# Patient Record
Sex: MALE | Race: WHITE | Employment: FULL TIME | URBAN - METROPOLITAN AREA
[De-identification: names, ages, dates, MRNs, and addresses within clinical notes are randomized per-mention and may not be internally consistent; named-entity substitution may affect disease eponyms.]

---

## 2021-06-24 RX ORDER — LISINOPRIL 5 MG/1
5 TABLET ORAL DAILY
Qty: 30 TABLET | Refills: 2 | Status: SHIPPED | OUTPATIENT
Start: 2021-06-24 | End: 2021-06-28 | Stop reason: SDUPTHER

## 2021-06-24 RX ORDER — LISINOPRIL 5 MG/1
5 TABLET ORAL DAILY
COMMUNITY
End: 2021-06-24 | Stop reason: SDUPTHER

## 2021-06-28 ENCOUNTER — OFFICE VISIT (OUTPATIENT)
Dept: PRIMARY CARE CLINIC | Age: 51
End: 2021-06-28
Payer: COMMERCIAL

## 2021-06-28 ENCOUNTER — TELEPHONE (OUTPATIENT)
Dept: PRIMARY CARE CLINIC | Age: 51
End: 2021-06-28

## 2021-06-28 VITALS
SYSTOLIC BLOOD PRESSURE: 136 MMHG | BODY MASS INDEX: 37.92 KG/M2 | OXYGEN SATURATION: 98 % | HEART RATE: 61 BPM | HEIGHT: 69 IN | WEIGHT: 256 LBS | DIASTOLIC BLOOD PRESSURE: 94 MMHG

## 2021-06-28 DIAGNOSIS — I10 ESSENTIAL HYPERTENSION: Primary | ICD-10-CM

## 2021-06-28 PROCEDURE — 99203 OFFICE O/P NEW LOW 30 MIN: CPT | Performed by: FAMILY MEDICINE

## 2021-06-28 RX ORDER — LISINOPRIL 5 MG/1
5 TABLET ORAL DAILY
Qty: 90 TABLET | Refills: 2 | Status: SHIPPED | OUTPATIENT
Start: 2021-06-28 | End: 2021-07-27 | Stop reason: SDUPTHER

## 2021-06-28 SDOH — ECONOMIC STABILITY: FOOD INSECURITY: WITHIN THE PAST 12 MONTHS, YOU WORRIED THAT YOUR FOOD WOULD RUN OUT BEFORE YOU GOT MONEY TO BUY MORE.: NEVER TRUE

## 2021-06-28 SDOH — ECONOMIC STABILITY: FOOD INSECURITY: WITHIN THE PAST 12 MONTHS, THE FOOD YOU BOUGHT JUST DIDN'T LAST AND YOU DIDN'T HAVE MONEY TO GET MORE.: NEVER TRUE

## 2021-06-28 ASSESSMENT — PATIENT HEALTH QUESTIONNAIRE - PHQ9
1. LITTLE INTEREST OR PLEASURE IN DOING THINGS: 0
SUM OF ALL RESPONSES TO PHQ QUESTIONS 1-9: 0
2. FEELING DOWN, DEPRESSED OR HOPELESS: 0
SUM OF ALL RESPONSES TO PHQ QUESTIONS 1-9: 0
SUM OF ALL RESPONSES TO PHQ9 QUESTIONS 1 & 2: 0
SUM OF ALL RESPONSES TO PHQ QUESTIONS 1-9: 0

## 2021-06-28 ASSESSMENT — ENCOUNTER SYMPTOMS
WHEEZING: 0
ABDOMINAL PAIN: 0
SORE THROAT: 0
SHORTNESS OF BREATH: 0

## 2021-06-28 ASSESSMENT — SOCIAL DETERMINANTS OF HEALTH (SDOH): HOW HARD IS IT FOR YOU TO PAY FOR THE VERY BASICS LIKE FOOD, HOUSING, MEDICAL CARE, AND HEATING?: NOT HARD AT ALL

## 2021-06-28 NOTE — PROGRESS NOTES
Subjective:     Patient ID: Mireille Baig is a 45 y.o. male    HPI  Type presents today to get it reestablished in the practice. He feels that he has been doing pretty well. He remarks that he is get a great job and really likes working to have good benefits. One of the benefits cysts that there is a health clinic where they check on him and do blood work and check his blood pressure and he feels good about that. His review of systems essentially unremarkable today. caffiene  optivia diet  Weekend drinker  Beer, rum and coke  FHx lung, breast ca  Neg heart, DM    Review of Systems   Constitutional: Negative for fever. HENT: Negative for congestion, ear pain and sore throat. Respiratory: Negative for shortness of breath and wheezing. Cardiovascular: Negative for chest pain and leg swelling. Gastrointestinal: Negative for abdominal pain. Genitourinary: Negative for dysuria. Skin: Negative for rash. Neurological: Negative for syncope. Hematological: Negative for adenopathy. Objective:     Physical Exam  Vitals and nursing note reviewed. Constitutional:       General: He is not in acute distress. Appearance: Normal appearance. HENT:      Head: Normocephalic. Right Ear: External ear normal.      Left Ear: External ear normal.      Nose: Nose normal.      Mouth/Throat:      Mouth: Mucous membranes are moist.   Eyes:      Conjunctiva/sclera: Conjunctivae normal.   Cardiovascular:      Rate and Rhythm: Normal rate and regular rhythm. Heart sounds: Normal heart sounds. Pulmonary:      Effort: Pulmonary effort is normal.      Breath sounds: Normal breath sounds. Musculoskeletal:      Cervical back: Normal range of motion. Right lower leg: No edema. Left lower leg: No edema. Skin:     General: Skin is warm and dry. Neurological:      General: No focal deficit present. Mental Status: He is alert and oriented to person, place, and time.    Psychiatric:

## 2021-06-28 NOTE — TELEPHONE ENCOUNTER
PT NEEDING REFILL ON LISINOPRIL 90 DAYS. MARIAELENA FAXED OVER REQUEST.  PENDED THE MED IN TODAY'S PRE-CHARTING FOR OFFICE VISIT LATER TODAY

## 2021-06-28 NOTE — PATIENT INSTRUCTIONS
WEIGHT LOSS    Recommend 10 % weight loss over 3 months  Low glycemic index diet  Avoid naked carbohydrates, e.g. peanut butter added to small banana  Avoid carbohydrate to fiber ratio more than 10:1  Whole oatmeal at least every other day at breakfast  May have 6 eggs per week  Focus on 25 gms of protein at breakfast  Avoid carbos first thing in morning to limit glucose surge  Limit animal protein  Limit dairy  Spinach salad daily  Try for 25-50 grams of fiber per day

## 2021-07-27 DIAGNOSIS — I10 ESSENTIAL HYPERTENSION: Primary | ICD-10-CM

## 2021-07-27 RX ORDER — LISINOPRIL 5 MG/1
5 TABLET ORAL DAILY
Qty: 90 TABLET | Refills: 2 | Status: SHIPPED | OUTPATIENT
Start: 2021-07-27 | End: 2021-09-01 | Stop reason: SDUPTHER

## 2021-09-01 DIAGNOSIS — I10 ESSENTIAL HYPERTENSION: ICD-10-CM

## 2021-09-01 RX ORDER — LISINOPRIL 5 MG/1
5 TABLET ORAL DAILY
Qty: 90 TABLET | Refills: 2 | Status: SHIPPED | OUTPATIENT
Start: 2021-09-01 | End: 2022-09-07 | Stop reason: SDUPTHER

## 2021-09-28 ENCOUNTER — OFFICE VISIT (OUTPATIENT)
Dept: PRIMARY CARE CLINIC | Age: 51
End: 2021-09-28
Payer: COMMERCIAL

## 2021-09-28 VITALS
HEIGHT: 69 IN | HEART RATE: 80 BPM | OXYGEN SATURATION: 98 % | SYSTOLIC BLOOD PRESSURE: 130 MMHG | DIASTOLIC BLOOD PRESSURE: 86 MMHG | WEIGHT: 258 LBS | BODY MASS INDEX: 38.21 KG/M2

## 2021-09-28 DIAGNOSIS — I10 ESSENTIAL HYPERTENSION: Primary | ICD-10-CM

## 2021-09-28 PROCEDURE — 99213 OFFICE O/P EST LOW 20 MIN: CPT | Performed by: FAMILY MEDICINE

## 2021-09-28 ASSESSMENT — PATIENT HEALTH QUESTIONNAIRE - PHQ9
SUM OF ALL RESPONSES TO PHQ QUESTIONS 1-9: 0
SUM OF ALL RESPONSES TO PHQ QUESTIONS 1-9: 0
SUM OF ALL RESPONSES TO PHQ9 QUESTIONS 1 & 2: 0
2. FEELING DOWN, DEPRESSED OR HOPELESS: 0
1. LITTLE INTEREST OR PLEASURE IN DOING THINGS: 0
SUM OF ALL RESPONSES TO PHQ QUESTIONS 1-9: 0

## 2021-09-28 NOTE — PROGRESS NOTES
Subjective:     Patient ID: Cally Bueno is a 45 y.o. male    HPI  Patient is here today for follow-up of his hypertension as well as his obesity. His daughter got  here recently and he admits that he has not watch his nutrition as closely as he should. His weight is up from previous visits. He does exercise on a regular basis with his work and thinks he walks 9-12,000 steps per day. Overall he feels well and does not complain of fatigue chest pain shortness of breath. He knows he needs to lose weight to decrease his risk. Had cscope earlier this year by Dr. Cj Snider at [facility].  We will ask for copy of report for our records. We also discussed PSA testing       Review of Systems   Constitutional: Negative for activity change, appetite change, fatigue and fever. HENT: Negative for sore throat. Eyes: Negative for visual disturbance. Respiratory: Negative for cough, chest tightness and shortness of breath. Cardiovascular: Negative for chest pain, palpitations and leg swelling. Gastrointestinal: Negative for constipation and diarrhea. Genitourinary: Negative for dysuria and urgency. Musculoskeletal: Negative for back pain. Neurological: Negative for dizziness, syncope and headaches. Hematological: Does not bruise/bleed easily. Psychiatric/Behavioral: Negative for confusion and sleep disturbance. The patient is not nervous/anxious. Objective:     Physical Exam  Constitutional:       Appearance: Normal appearance. He is obese. HENT:      Head: Normocephalic. Right Ear: External ear normal.      Left Ear: External ear normal.      Nose: Nose normal.      Mouth/Throat:      Mouth: Mucous membranes are moist.      Pharynx: Oropharynx is clear. Eyes:      Conjunctiva/sclera: Conjunctivae normal.   Cardiovascular:      Rate and Rhythm: Normal rate and regular rhythm. Pulses: Normal pulses. Heart sounds: Normal heart sounds. No murmur heard. Pulmonary:      Effort: Pulmonary effort is normal.      Breath sounds: Normal breath sounds. Musculoskeletal:         General: Normal range of motion. Cervical back: Neck supple. Right lower leg: No edema. Left lower leg: No edema. Lymphadenopathy:      Cervical: No cervical adenopathy. Skin:     General: Skin is warm and dry. Capillary Refill: Capillary refill takes less than 2 seconds. Neurological:      General: No focal deficit present. Mental Status: He is alert and oriented to person, place, and time. Psychiatric:         Mood and Affect: Mood normal.         Behavior: Behavior normal.           Assessment/Plan:     1. Essential hypertension    2. BMI 38.0-38.9,adult            Sharon Howard was seen today for hypertension. Diagnoses and all orders for this visit:    Essential hypertension    BMI 38.0-38.9,adult    His blood pressure is fairly well controlled with his dose of Prinivil. Reinforced the need for weight loss. If he got a 10% weight loss he would feel better with more energy and potentially be off blood pressure medicines. Patient verbalized understanding. Patient instructions given with specifics in regard to weight loss          Cristela Miller MD    Please note that this chart was generated using voice recognition Dragon dictation software. Although every effort was made to ensure the accuracy of this automated transcription, some errors in transcription may have occurred.

## 2021-09-29 ASSESSMENT — ENCOUNTER SYMPTOMS
SORE THROAT: 0
CHEST TIGHTNESS: 0
CONSTIPATION: 0
SHORTNESS OF BREATH: 0
COUGH: 0
BACK PAIN: 0
DIARRHEA: 0

## 2021-10-04 ENCOUNTER — TELEPHONE (OUTPATIENT)
Dept: PRIMARY CARE CLINIC | Age: 51
End: 2021-10-04

## 2021-10-04 NOTE — TELEPHONE ENCOUNTER
----- Message from Sirena Lara RN sent at 9/30/2021  4:26 PM EDT -----  Regarding: colonoscopy  No rush, can you check on the colonoscopy report from the spring at [facility]

## 2022-09-06 ENCOUNTER — TELEPHONE (OUTPATIENT)
Dept: PRIMARY CARE CLINIC | Age: 52
End: 2022-09-06

## 2022-09-06 NOTE — TELEPHONE ENCOUNTER
----- Message from Kati Cardenas sent at 9/6/2022  9:10 AM EDT -----  Subject: Appointment Request    Reason for Call: Established Patient Appointment needed: Routine Existing   Condition Follow Up    QUESTIONS    Reason for appointment request? Available appointments did not meet   patient need     Additional Information for Provider? patient will be out of bp medicine in   2 wks and would like an appt.  before November around 3:30  ---------------------------------------------------------------------------  --------------  [id number]  [phone]; OK to leave message on voicemail  ---------------------------------------------------------------------------  --------------  SCRIPT ANSWERS  COVID Screen: Latricia Medina

## 2022-09-07 DIAGNOSIS — I10 ESSENTIAL HYPERTENSION: ICD-10-CM

## 2022-09-07 RX ORDER — LISINOPRIL 5 MG/1
5 TABLET ORAL DAILY
Qty: 90 TABLET | Refills: 0 | Status: SHIPPED | OUTPATIENT
Start: 2022-09-07 | End: 2022-10-13 | Stop reason: SDUPTHER

## 2022-09-07 NOTE — TELEPHONE ENCOUNTER
----- Message from [facility] BENI Mosher sent at 9/7/2022 12:43 PM EDT -----  Subject: Appointment Request    Reason for Call: Established Patient Appointment needed: Routine Existing   Condition Follow Up    QUESTIONS    Reason for appointment request? Available appointments did not meet   patient need     Additional Information for Provider? The pt. called and stated that he   needs a refill on his blood pressure medications and there are currently   no available appt times for the pt until November. The pt stated he has   only ten more days of his medication left at this time. The pt would like   to know if there are any other options at this time.   ---------------------------------------------------------------------------  --------------  Omelia Counter [facility]  [phone]; OK to leave message on voicemail  ---------------------------------------------------------------------------  --------------  SCRIPT ANSWERS  COVID Screen: Earnestine Dwyer

## 2022-09-07 NOTE — TELEPHONE ENCOUNTER
Patient scheduled for 10/13/22. Patient will be out of medication before time of appointment. Pended one month supply. Let patient know I cannot guarantee refill since he has not been seen since last year.

## 2022-10-13 ENCOUNTER — OFFICE VISIT (OUTPATIENT)
Dept: PRIMARY CARE CLINIC | Age: 52
End: 2022-10-13
Payer: COMMERCIAL

## 2022-10-13 VITALS
SYSTOLIC BLOOD PRESSURE: 120 MMHG | BODY MASS INDEX: 39.69 KG/M2 | OXYGEN SATURATION: 98 % | DIASTOLIC BLOOD PRESSURE: 78 MMHG | HEIGHT: 69 IN | HEART RATE: 65 BPM | WEIGHT: 268 LBS

## 2022-10-13 DIAGNOSIS — Z12.11 SCREENING FOR COLON CANCER: ICD-10-CM

## 2022-10-13 DIAGNOSIS — I10 ESSENTIAL HYPERTENSION: Primary | ICD-10-CM

## 2022-10-13 PROCEDURE — 99214 OFFICE O/P EST MOD 30 MIN: CPT | Performed by: FAMILY MEDICINE

## 2022-10-13 RX ORDER — LISINOPRIL 5 MG/1
5 TABLET ORAL DAILY
Qty: 30 TABLET | Refills: 0 | Status: SHIPPED | OUTPATIENT
Start: 2022-10-13 | End: 2022-10-30 | Stop reason: SDUPTHER

## 2022-10-13 SDOH — ECONOMIC STABILITY: FOOD INSECURITY: WITHIN THE PAST 12 MONTHS, YOU WORRIED THAT YOUR FOOD WOULD RUN OUT BEFORE YOU GOT MONEY TO BUY MORE.: NEVER TRUE

## 2022-10-13 SDOH — ECONOMIC STABILITY: FOOD INSECURITY: WITHIN THE PAST 12 MONTHS, THE FOOD YOU BOUGHT JUST DIDN'T LAST AND YOU DIDN'T HAVE MONEY TO GET MORE.: NEVER TRUE

## 2022-10-13 ASSESSMENT — PATIENT HEALTH QUESTIONNAIRE - PHQ9
SUM OF ALL RESPONSES TO PHQ QUESTIONS 1-9: 0
2. FEELING DOWN, DEPRESSED OR HOPELESS: 0
SUM OF ALL RESPONSES TO PHQ QUESTIONS 1-9: 0
SUM OF ALL RESPONSES TO PHQ QUESTIONS 1-9: 0
1. LITTLE INTEREST OR PLEASURE IN DOING THINGS: 0
SUM OF ALL RESPONSES TO PHQ QUESTIONS 1-9: 0
SUM OF ALL RESPONSES TO PHQ9 QUESTIONS 1 & 2: 0

## 2022-10-13 ASSESSMENT — ENCOUNTER SYMPTOMS
COUGH: 0
SHORTNESS OF BREATH: 0
SORE THROAT: 0
DIARRHEA: 0
CONSTIPATION: 0
BACK PAIN: 0
CHEST TIGHTNESS: 0

## 2022-10-13 ASSESSMENT — SOCIAL DETERMINANTS OF HEALTH (SDOH): HOW HARD IS IT FOR YOU TO PAY FOR THE VERY BASICS LIKE FOOD, HOUSING, MEDICAL CARE, AND HEATING?: NOT HARD AT ALL

## 2022-10-13 NOTE — PROGRESS NOTES
Subjective:     Patient ID: Izell Saint is a 45 y.o. male    HPI  Mile Azar returns today for recheck on HT and obesity. He had labs done at job which I reviewed for him. His FBS was elevated, waiting on A1c. Overall he feels well but has had no luck losing weight. He would like to try something to help. We discussed Mounjaro at length. He understands that it would be off label use but is agreeable  Has been golfing all summer. Reinforced the LGI     Review of Systems   Constitutional:  Negative for activity change, appetite change, fatigue and fever. HENT:  Negative for sore throat. Eyes:  Negative for visual disturbance. Respiratory:  Negative for cough, chest tightness and shortness of breath. Cardiovascular:  Negative for chest pain, palpitations and leg swelling. Gastrointestinal:  Negative for constipation and diarrhea. Genitourinary:  Negative for dysuria and urgency. Musculoskeletal:  Negative for back pain. Neurological:  Negative for dizziness, syncope and headaches. Hematological:  Does not bruise/bleed easily. Psychiatric/Behavioral:  Negative for confusion and sleep disturbance. The patient is not nervous/anxious. Objective:     Physical Exam  Vitals and nursing note reviewed. Constitutional:       Appearance: Normal appearance. He is obese. HENT:      Head: Normocephalic. Right Ear: External ear normal.      Left Ear: External ear normal.      Nose: Nose normal.      Mouth/Throat:      Mouth: Mucous membranes are moist.      Pharynx: Oropharynx is clear. Eyes:      Conjunctiva/sclera: Conjunctivae normal.      Pupils: Pupils are equal, round, and reactive to light. Cardiovascular:      Rate and Rhythm: Normal rate and regular rhythm. Pulses: Normal pulses. Heart sounds: Normal heart sounds. No murmur heard. Pulmonary:      Effort: Pulmonary effort is normal.      Breath sounds: Normal breath sounds. No wheezing.    Musculoskeletal:         General: Normal range of motion. Cervical back: Neck supple. Right lower leg: No edema. Left lower leg: No edema. Lymphadenopathy:      Cervical: No cervical adenopathy. Skin:     General: Skin is warm and dry. Capillary Refill: Capillary refill takes less than 2 seconds. Neurological:      General: No focal deficit present. Mental Status: He is alert and oriented to person, place, and time. Psychiatric:         Mood and Affect: Mood normal.         Behavior: Behavior normal.       Assessment/Plan:     1. Essential hypertension    2. BMI 39.0-39.9,adult    3. Screening for colon cancer          Dawson Rahul was seen today for hypertension. Diagnoses and all orders for this visit:    Essential hypertension  -     lisinopril (PRINIVIL;ZESTRIL) 5 MG tablet; Take 1 tablet by mouth daily  Good control   avoid added salt and caffeine  BMI 39.0-39.9,adult  Reinforced weight loss regimen  Wants to try [facility] at 2.5 mg weekly  Rto 1 month, if tolerates increase dose to 5 mg. Screening for colon cancer  -     Cologuard (Fecal DNA Colorectal Cancer Screening)    Other orders  -     Tirzepatide (MOUNJARO) 2.5 MG/0.5ML SOPN SC injection; Inject 0.5 mLs into the skin once a week        Fiorella Duran MD    Please note that this chart was generated using voice recognition Dragon dictation software. Although every effort was made to ensure the accuracy of this automated transcription, some errors in transcription may have occurred.

## 2022-10-14 DIAGNOSIS — I10 ESSENTIAL HYPERTENSION: ICD-10-CM

## 2022-10-14 NOTE — TELEPHONE ENCOUNTER
[facility] asking for 90 days to be call in . Confirm with [facility] ok 90 days supply with no refills .

## 2022-10-14 NOTE — TELEPHONE ENCOUNTER
Last visit: 10/13/22  Last Med refill: 10/13/22  Does patient have enough medication for 72 hours: Next Visit Date:  Future Appointments   Date Time Provider Donya Sandoval   11/28/2022  4:30 PM Brad Clemons MD [place] [facility]   Topic Date Due    DTaP/Tdap/Td vaccine (1 - Tdap) Never done    Diabetes screen  Never done    Lipids  Never done    Shingles vaccine (1 of 2) Never done    COVID-19 Vaccine (4 - Booster for Pfizer series) 05/14/2022    Flu vaccine (1) Never done    Depression Screen  10/13/2023    Colorectal Cancer Screen  09/18/2030    Hepatitis A vaccine  Aged Out    Hib vaccine  Aged Out    Meningococcal (ACWY) vaccine  Aged Out    Pneumococcal 0-64 years Vaccine  Aged Out    Hepatitis C screen  Discontinued    HIV screen  Discontinued       No results found for: LABA1C          ( goal A1C is < 7)   No results found for: LABMICR  No results found for: LDLCHOLESTEROL, [address]    (goal LDL is <100)   No results found for: AST, ALT, BUN, CR  BP Readings from Last 3 Encounters:   10/13/22 120/78   09/28/21 130/86   06/28/21 (!) 136/94          (goal 120/80)    All Future Testing planned in [facility]  Lab Frequency Next Occurrence               There is no problem list on file for this patient.

## 2022-10-18 RX ORDER — LISINOPRIL 5 MG/1
5 TABLET ORAL DAILY
Qty: 90 TABLET | OUTPATIENT
Start: 2022-10-18 | End: 2022-11-17

## 2022-10-20 ENCOUNTER — TELEPHONE (OUTPATIENT)
Dept: PRIMARY CARE CLINIC | Age: 52
End: 2022-10-20

## 2022-10-20 NOTE — TELEPHONE ENCOUNTER
Olga called stating her  is having a hard time getting his Mounjaro medication from [email or web address] in . She states that the pharmacy kept trying to run it through insurance and she told them that they arent using the insurance with it, they are using the [address] coupon. The pharmacy then proceeded to tell them that they can use the form from [facility] due to the patient not having a dx of Type II DM. Jennie Singh has not been able to start [facility] yet because of this. They are asking for help from the office. I told [address] we would reach out to [place] and call her back.

## 2022-10-20 NOTE — TELEPHONE ENCOUNTER
Mi just called back to let us know that they are going to process the script for the patient now. The pharmacist also stated that she will call  the patient now to let them know.

## 2022-10-20 NOTE — TELEPHONE ENCOUNTER
I did a pre auth for the medication just now, it was sent to insurance plan. I called sebastian to let her know that I spoke with the pharmacy and that we are going to contact our drug rep for answers on what diagnosis are approved and get back with her tomorrow.

## 2022-10-28 ENCOUNTER — PATIENT MESSAGE (OUTPATIENT)
Dept: PRIMARY CARE CLINIC | Age: 52
End: 2022-10-28

## 2022-10-28 DIAGNOSIS — I10 ESSENTIAL HYPERTENSION: ICD-10-CM

## 2022-10-28 NOTE — TELEPHONE ENCOUNTER
From: Hui Salgado  To: Dr. Dhruv Parada: 10/28/2022 8:48 AM EDT  Subject: Next appt & prescriptions    Hello,  I have a couple issues. 1. I started Mounjaro on 10/22/22. I am doing very well on the 2.5 mg dose and would like to increase to the 5 mg after I do my fourth injection on 11/12/22. However, my next appt with you is not until 11/28/22. I am out of town from 11/12-11/19. I don't want to do the refill for 2.5 mg. I have to have appts after 3:45 pm on weekdays otherwise I lose incentive pay at work. What do I do? 2. I do not have enough blood pressure pills to last me until 11/28/22. If I can get a written prescription, I can get them filled free at my work's medical clinic. Thank you!

## 2022-10-30 RX ORDER — TIRZEPATIDE 5 MG/.5ML
5 INJECTION, SOLUTION SUBCUTANEOUS WEEKLY
Qty: 4 ADJUSTABLE DOSE PRE-FILLED PEN SYRINGE | Refills: 5 | Status: SHIPPED | OUTPATIENT
Start: 2022-10-30

## 2022-10-30 RX ORDER — LISINOPRIL 5 MG/1
5 TABLET ORAL DAILY
Qty: 30 TABLET | Refills: 5 | Status: SHIPPED | OUTPATIENT
Start: 2022-10-30 | End: 2022-11-01 | Stop reason: SDUPTHER

## 2022-11-01 DIAGNOSIS — I10 ESSENTIAL HYPERTENSION: ICD-10-CM

## 2022-11-01 RX ORDER — LISINOPRIL 5 MG/1
5 TABLET ORAL DAILY
Qty: 30 TABLET | Refills: 5 | Status: SHIPPED | OUTPATIENT
Start: 2022-11-01 | End: 2022-12-01

## 2022-11-28 ENCOUNTER — OFFICE VISIT (OUTPATIENT)
Dept: PRIMARY CARE CLINIC | Age: 52
End: 2022-11-28
Payer: COMMERCIAL

## 2022-11-28 VITALS
HEIGHT: 69 IN | DIASTOLIC BLOOD PRESSURE: 84 MMHG | BODY MASS INDEX: 38.09 KG/M2 | WEIGHT: 257.2 LBS | HEART RATE: 76 BPM | OXYGEN SATURATION: 97 % | SYSTOLIC BLOOD PRESSURE: 122 MMHG

## 2022-11-28 DIAGNOSIS — I10 ESSENTIAL HYPERTENSION: ICD-10-CM

## 2022-11-28 DIAGNOSIS — E66.01 MORBID OBESITY (HCC): ICD-10-CM

## 2022-11-28 DIAGNOSIS — R10.9 LEFT FLANK PAIN: Primary | ICD-10-CM

## 2022-11-28 PROCEDURE — 3074F SYST BP LT 130 MM HG: CPT | Performed by: FAMILY MEDICINE

## 2022-11-28 PROCEDURE — 99214 OFFICE O/P EST MOD 30 MIN: CPT | Performed by: FAMILY MEDICINE

## 2022-11-28 PROCEDURE — 3078F DIAST BP <80 MM HG: CPT | Performed by: FAMILY MEDICINE

## 2022-11-28 ASSESSMENT — PATIENT HEALTH QUESTIONNAIRE - PHQ9
1. LITTLE INTEREST OR PLEASURE IN DOING THINGS: 0
SUM OF ALL RESPONSES TO PHQ QUESTIONS 1-9: 0
SUM OF ALL RESPONSES TO PHQ9 QUESTIONS 1 & 2: 0
2. FEELING DOWN, DEPRESSED OR HOPELESS: 0
SUM OF ALL RESPONSES TO PHQ QUESTIONS 1-9: 0

## 2022-11-28 ASSESSMENT — ENCOUNTER SYMPTOMS
DIARRHEA: 0
SORE THROAT: 0
CONSTIPATION: 0
BACK PAIN: 1
WHEEZING: 0
NAUSEA: 0
ABDOMINAL PAIN: 0
SHORTNESS OF BREATH: 0

## 2022-11-28 NOTE — PROGRESS NOTES
Subjective:     Patient ID: Jaclyn Damico is a 45 y.o. male    HPI  Yamila Shields returns today for a follow-up on his morbid obesity after starting  Alec Heater and is doing quite well without side effects. He feels he could be doing a lot better with his diet but was on a cruise 2 weeks ago. He feels more full and satisfied earlier. Has lost 11 pounds and is really excited about these changes  Sometime after he started the medicine he developed some left low back pain. It apparently became much worse when he was on a cruise ship. He had blood work done which was all within normal limits and his white blood count was normal.  He says since he has been home he has actually felt much better but does have some pain when he goes to tie his. Denies any change in his bowel movements in regard to the flank pain. Denies any dysuria or blood in urine to suggest kidney stone. Review of Systems   Constitutional:  Negative for fever. HENT:  Negative for congestion, ear pain and sore throat. Respiratory:  Negative for shortness of breath and wheezing. Cardiovascular:  Negative for chest pain and leg swelling. Gastrointestinal:  Negative for abdominal pain, constipation, diarrhea and nausea. Genitourinary:  Positive for flank pain. Negative for difficulty urinating, dysuria, frequency, hematuria and urgency. Musculoskeletal:  Positive for back pain. Skin:  Negative for rash. Neurological:  Negative for syncope. Hematological:  Negative for adenopathy. Psychiatric/Behavioral:  Negative for sleep disturbance. The patient is not nervous/anxious. Objective:     Physical Exam  Constitutional:       Appearance: He is obese. Abdominal:      General: There is no distension. Palpations: There is no mass. Tenderness: There is no abdominal tenderness. There is left CVA tenderness. There is no guarding or rebound.    Musculoskeletal:      Comments: Tenderness he has in his left flank really appears to be muscular in origin on the basis of my exam       Assessment/Plan:     1. Left flank pain    2. BMI 37.0-37.9, adult    3. Essential hypertension          Adalgisa Escobar was seen today for hypertension, other and back pain. Diagnoses and all orders for this visit:    Left flank pain  Most probably musculoskeletal.  Doubt kidney stone or diverticular disease. If gets worse will do imaging. Patient will call  Morbid obesity  BMI 37.0-37.9, adult  Continue Mounjaro   Essential hypertension  Good control with current medications    Recheck in 2 months  Bibi Bethea MD    Please note that this chart was generated using voice recognition Dragon dictation software. Although every effort was made to ensure the accuracy of this automated transcription, some errors in transcription may have occurred.

## 2023-01-03 ENCOUNTER — PATIENT MESSAGE (OUTPATIENT)
Dept: PRIMARY CARE CLINIC | Age: 53
End: 2023-01-03

## 2023-01-04 NOTE — TELEPHONE ENCOUNTER
From: Baird Crigler  To: Dr. Diaz Ply: 1/3/2023 6:55 PM EST  Subject: 7.5 mg Mounjaro    Hello,    I would like my next refill of Mounjaro to be increased to 7.5 mg please. I have changed pharmacies to:    [facility]  [address] [facility] [place]  [phone]    Thank you,   Baird Crigler

## 2023-01-30 ENCOUNTER — PATIENT MESSAGE (OUTPATIENT)
Dept: PRIMARY CARE CLINIC | Age: 53
End: 2023-01-30

## 2023-01-31 NOTE — TELEPHONE ENCOUNTER
From: Foster Alexandre  To: Dr. Magda Juan: 1/30/2023 5:46 PM EST  Subject: Climax Burnett 10 mg    Hello,    I would like to increase my refill this week to 10mg. Thank you!   Michoacano Good

## 2023-02-06 ENCOUNTER — OFFICE VISIT (OUTPATIENT)
Dept: PRIMARY CARE CLINIC | Age: 53
End: 2023-02-06
Payer: COMMERCIAL

## 2023-02-06 VITALS
DIASTOLIC BLOOD PRESSURE: 84 MMHG | SYSTOLIC BLOOD PRESSURE: 126 MMHG | WEIGHT: 245 LBS | HEART RATE: 65 BPM | BODY MASS INDEX: 36.29 KG/M2 | OXYGEN SATURATION: 97 % | HEIGHT: 69 IN

## 2023-02-06 DIAGNOSIS — E88.81 INSULIN RESISTANCE: ICD-10-CM

## 2023-02-06 DIAGNOSIS — I10 ESSENTIAL HYPERTENSION: Primary | ICD-10-CM

## 2023-02-06 DIAGNOSIS — E66.01 MORBID OBESITY (HCC): ICD-10-CM

## 2023-02-06 PROCEDURE — 3079F DIAST BP 80-89 MM HG: CPT | Performed by: FAMILY MEDICINE

## 2023-02-06 PROCEDURE — 3074F SYST BP LT 130 MM HG: CPT | Performed by: FAMILY MEDICINE

## 2023-02-06 PROCEDURE — 99214 OFFICE O/P EST MOD 30 MIN: CPT | Performed by: FAMILY MEDICINE

## 2023-02-06 SDOH — ECONOMIC STABILITY: INCOME INSECURITY: HOW HARD IS IT FOR YOU TO PAY FOR THE VERY BASICS LIKE FOOD, HOUSING, MEDICAL CARE, AND HEATING?: NOT HARD AT ALL

## 2023-02-06 SDOH — ECONOMIC STABILITY: FOOD INSECURITY: WITHIN THE PAST 12 MONTHS, THE FOOD YOU BOUGHT JUST DIDN'T LAST AND YOU DIDN'T HAVE MONEY TO GET MORE.: NEVER TRUE

## 2023-02-06 SDOH — ECONOMIC STABILITY: FOOD INSECURITY: WITHIN THE PAST 12 MONTHS, YOU WORRIED THAT YOUR FOOD WOULD RUN OUT BEFORE YOU GOT MONEY TO BUY MORE.: NEVER TRUE

## 2023-02-06 SDOH — ECONOMIC STABILITY: HOUSING INSECURITY
IN THE LAST 12 MONTHS, WAS THERE A TIME WHEN YOU DID NOT HAVE A STEADY PLACE TO SLEEP OR SLEPT IN A SHELTER (INCLUDING NOW)?: NO

## 2023-02-06 ASSESSMENT — ENCOUNTER SYMPTOMS
BACK PAIN: 0
CONSTIPATION: 0
SHORTNESS OF BREATH: 0
CHEST TIGHTNESS: 0
COUGH: 0
DIARRHEA: 0
SORE THROAT: 0

## 2023-02-06 ASSESSMENT — PATIENT HEALTH QUESTIONNAIRE - PHQ9
SUM OF ALL RESPONSES TO PHQ QUESTIONS 1-9: 0
2. FEELING DOWN, DEPRESSED OR HOPELESS: 0
SUM OF ALL RESPONSES TO PHQ9 QUESTIONS 1 & 2: 0
1. LITTLE INTEREST OR PLEASURE IN DOING THINGS: 0
SUM OF ALL RESPONSES TO PHQ QUESTIONS 1-9: 0

## 2023-02-06 NOTE — PROGRESS NOTES
.Subjective:     Patient ID: Hanh Salomon is a 47 y.o. male    HPI  Marsha Gonzalez returns today for follow-up on his hypertension his morbid obesity and his insulin resistance. He has tolerated the injection well and would like to increase the dose to 10 mg weekly. Overall he has lost 23 pounds since October 15. He says he has completely changed his eating habits and does not crave sweets and other carbohydrates is much now. He notices that he has more energy and less joint aches. Review of Systems   Constitutional:  Negative for activity change, appetite change, fatigue and fever. HENT:  Negative for sore throat. Eyes:  Negative for visual disturbance. Respiratory:  Negative for cough, chest tightness and shortness of breath. Cardiovascular:  Negative for chest pain, palpitations and leg swelling. Gastrointestinal:  Negative for constipation and diarrhea. Genitourinary:  Negative for dysuria and urgency. Musculoskeletal:  Negative for back pain. Neurological:  Negative for dizziness, syncope and headaches. Hematological:  Does not bruise/bleed easily. Psychiatric/Behavioral:  Negative for confusion and sleep disturbance. The patient is not nervous/anxious. Objective:     Physical Exam  Constitutional:       Appearance: Normal appearance. He is obese. Comments: He is less obese than he was. He has dropped his BMI from 39-36   HENT:      Head: Normocephalic. Right Ear: External ear normal.      Left Ear: External ear normal.      Nose: Nose normal.      Mouth/Throat:      Mouth: Mucous membranes are moist.      Pharynx: Oropharynx is clear. Eyes:      Conjunctiva/sclera: Conjunctivae normal.   Cardiovascular:      Rate and Rhythm: Normal rate and regular rhythm. Pulses: Normal pulses. Heart sounds: Normal heart sounds. No murmur heard. Pulmonary:      Effort: Pulmonary effort is normal.      Breath sounds: Normal breath sounds.    Musculoskeletal:         General: Normal range of motion. Cervical back: Neck supple. Right lower leg: No edema. Left lower leg: No edema. Lymphadenopathy:      Cervical: No cervical adenopathy. Skin:     General: Skin is warm and dry. Capillary Refill: Capillary refill takes less than 2 seconds. Neurological:      General: No focal deficit present. Mental Status: He is alert and oriented to person, place, and time. Psychiatric:         Mood and Affect: Mood normal.         Behavior: Behavior normal.       Assessment/Plan:     1. Essential hypertension    2. Morbid obesity ([address]    3. Insulin resistance          Danni Dutton was seen today for hypertension and other. Diagnoses and all orders for this visit:    Essential hypertension  Good control with lisinopril 5 mg and of course the weight loss  Morbid obesity (HCC)  Mounjaro 10 mg weekly. Discussed with the patient that the studies suggest a potential 25% weight loss at 10 mg over 72 weeks. Insulin resistance  Continue low glycemic index. He does a lot of walking at work. Recommend that he add some resistance exercises gave him a sheet on HIIT and [employer]    Jj Camarena MD    Please note that this chart was generated using voice recognition Dragon dictation software. Although every effort was made to ensure the accuracy of this automated transcription, some errors in transcription may have occurred.

## 2023-03-17 ENCOUNTER — PATIENT MESSAGE (OUTPATIENT)
Dept: PRIMARY CARE CLINIC | Age: 53
End: 2023-03-17

## 2023-03-20 NOTE — TELEPHONE ENCOUNTER
From: Constance Richards  To: Dr. Brooks Slipper: 3/17/2023 5:50 PM EDT  Subject: [facility] 12.5    Hello,    For my last refill of Mounjaro, can I go up to 12.5 mg please? I only lost about 2 pounds during the last month with the 10 mg. I think I need stronger.      Thank you,  Constance Richards

## 2023-03-20 NOTE — TELEPHONE ENCOUNTER
From: Casey Cavanaugh  To: Dr. Bandar Conroy: 3/17/2023 3:10 PM EDT  Subject: Notice from my insurance    Hello,    I just received this letter from my insurance. 1) i didn't think Marquescam León was actually running through my insurance. I thought it was going through the [facility] [email or web address]. Does this mean I'm not going to be able to get it anymore? 2) Is Ozempic an option if I can't get Mounjaro?     Thank you,   Joshua Gallardo

## 2023-04-24 ENCOUNTER — PATIENT MESSAGE (OUTPATIENT)
Dept: PRIMARY CARE CLINIC | Age: 53
End: 2023-04-24

## 2023-05-17 ENCOUNTER — OFFICE VISIT (OUTPATIENT)
Dept: PRIMARY CARE CLINIC | Age: 53
End: 2023-05-17
Payer: COMMERCIAL

## 2023-05-17 VITALS
HEART RATE: 68 BPM | BODY MASS INDEX: 33.86 KG/M2 | SYSTOLIC BLOOD PRESSURE: 126 MMHG | HEIGHT: 69 IN | OXYGEN SATURATION: 98 % | WEIGHT: 228.6 LBS | DIASTOLIC BLOOD PRESSURE: 82 MMHG

## 2023-05-17 DIAGNOSIS — E88.81 INSULIN RESISTANCE: ICD-10-CM

## 2023-05-17 DIAGNOSIS — I10 ESSENTIAL HYPERTENSION: Primary | ICD-10-CM

## 2023-05-17 DIAGNOSIS — E66.01 MORBID OBESITY (HCC): ICD-10-CM

## 2023-05-17 PROCEDURE — 99214 OFFICE O/P EST MOD 30 MIN: CPT | Performed by: FAMILY MEDICINE

## 2023-05-17 PROCEDURE — 3074F SYST BP LT 130 MM HG: CPT | Performed by: FAMILY MEDICINE

## 2023-05-17 PROCEDURE — 3078F DIAST BP <80 MM HG: CPT | Performed by: FAMILY MEDICINE

## 2023-05-17 RX ORDER — LISINOPRIL 5 MG/1
5 TABLET ORAL DAILY
Qty: 30 TABLET | Refills: 5 | Status: SHIPPED | OUTPATIENT
Start: 2023-05-17 | End: 2023-06-16

## 2023-05-17 ASSESSMENT — PATIENT HEALTH QUESTIONNAIRE - PHQ9
SUM OF ALL RESPONSES TO PHQ QUESTIONS 1-9: 0
2. FEELING DOWN, DEPRESSED OR HOPELESS: 0
1. LITTLE INTEREST OR PLEASURE IN DOING THINGS: 0
SUM OF ALL RESPONSES TO PHQ9 QUESTIONS 1 & 2: 0
SUM OF ALL RESPONSES TO PHQ QUESTIONS 1-9: 0

## 2023-05-20 ASSESSMENT — ENCOUNTER SYMPTOMS
SHORTNESS OF BREATH: 0
WHEEZING: 0
ABDOMINAL PAIN: 0
SORE THROAT: 0

## 2023-05-20 NOTE — PROGRESS NOTES
Subjective:     Patient ID: Foster Alexandre is a 47 y.o. male    Hypertension  Pertinent negatives include no chest pain or shortness of breath. Patient is here for recheck on his hypertension insulin resistance and obesity. His hypertensions been under good control and he denies any chest pain shortness of breath palpitations headaches or dizziness. In fact he like to talk about potentially weaning off the medicine since he has had a notable weight loss. However his blood pressure is at target and he is not having any side effects nor any hypo tensive spells. [place]ues to [place] [facility] Did have some difficulty with his insurance covering but he now has a new refill and is quite happy with the medication. We spent some time talking about the effects of the medication long-term and that hopefully he has learned lifestyle modification techniques to keep the weight off long-term. He stated that he knows he does not have as much appetite as he used to i before the medication. Does get a fair amount of exercise at work. Overall he states he feels so much better  Has lost 40 pounds    Review of Systems   Constitutional:  Negative for fever. HENT:  Negative for congestion, ear pain and sore throat. Respiratory:  Negative for shortness of breath and wheezing. Cardiovascular:  Negative for chest pain and leg swelling. Gastrointestinal:  Negative for abdominal pain. Genitourinary:  Negative for dysuria. Skin:  Negative for rash. Neurological:  Negative for syncope. Hematological:  Negative for adenopathy. Psychiatric/Behavioral:  Negative for sleep disturbance. The patient is not nervous/anxious. Objective:     Physical Exam  Vitals and nursing note reviewed. Constitutional:       General: He is not in acute distress. Appearance: Normal appearance. He is obese. He is not ill-appearing. HENT:      Head: Normocephalic.       Right Ear: External ear normal.      Left Ear: External

## 2023-08-29 ENCOUNTER — OFFICE VISIT (OUTPATIENT)
Dept: PRIMARY CARE CLINIC | Age: 53
End: 2023-08-29
Payer: COMMERCIAL

## 2023-08-29 VITALS
BODY MASS INDEX: 33.44 KG/M2 | HEIGHT: 69 IN | SYSTOLIC BLOOD PRESSURE: 110 MMHG | DIASTOLIC BLOOD PRESSURE: 68 MMHG | HEART RATE: 57 BPM | OXYGEN SATURATION: 96 % | WEIGHT: 225.8 LBS

## 2023-08-29 DIAGNOSIS — E88.81 INSULIN RESISTANCE: ICD-10-CM

## 2023-08-29 DIAGNOSIS — I10 ESSENTIAL HYPERTENSION: Primary | ICD-10-CM

## 2023-08-29 PROCEDURE — 99214 OFFICE O/P EST MOD 30 MIN: CPT | Performed by: FAMILY MEDICINE

## 2023-08-29 PROCEDURE — 3074F SYST BP LT 130 MM HG: CPT | Performed by: FAMILY MEDICINE

## 2023-08-29 PROCEDURE — 3078F DIAST BP <80 MM HG: CPT | Performed by: FAMILY MEDICINE

## 2023-08-29 RX ORDER — LISINOPRIL 5 MG/1
5 TABLET ORAL DAILY
Qty: 90 TABLET | Refills: 1 | Status: SHIPPED | OUTPATIENT
Start: 2023-08-29 | End: 2024-02-25

## 2023-08-29 ASSESSMENT — PATIENT HEALTH QUESTIONNAIRE - PHQ9
2. FEELING DOWN, DEPRESSED OR HOPELESS: 0
SUM OF ALL RESPONSES TO PHQ QUESTIONS 1-9: 0
SUM OF ALL RESPONSES TO PHQ9 QUESTIONS 1 & 2: 0
1. LITTLE INTEREST OR PLEASURE IN DOING THINGS: 0
SUM OF ALL RESPONSES TO PHQ QUESTIONS 1-9: 0

## 2023-08-29 ASSESSMENT — ENCOUNTER SYMPTOMS
WHEEZING: 0
ABDOMINAL PAIN: 0
SHORTNESS OF BREATH: 0
SORE THROAT: 0

## 2023-08-29 NOTE — PROGRESS NOTES
Subjective:     Patient ID: Chante Santiago is a 47 y.o. male    Hypertension  Pertinent negatives include no chest pain or shortness of breath. Awa Vargas returns today for recheck on his hypertension he has been compliant with his blood pressure medicine avoids caffeine added salt and stimulants. He gets a lot of exercise at work. He has been following a low glycemic index high-fiber diet and with the Bella Skiff has lost in excess of 40 pounds. He says he feels so much better it is hard to believe. Less joint pain more energy. He also has insulin resistance. He is no longer able to get Bella Skiff for the coupon price. He is doing 10 mg every other week and has noted 2 more shots left and is likely not going to do it anymore his last A1c on August 8 was 5.6. He is due to have the rest of his labs checked    We talked about the compounded semaglutide as an alternative to help to keep the weight loss going. Gave him some information for the compounding pharmacy to review. Review of Systems   Constitutional:  Negative for fever. HENT:  Negative for congestion, ear pain and sore throat. Respiratory:  Negative for shortness of breath and wheezing. Cardiovascular:  Negative for chest pain and leg swelling. Gastrointestinal:  Negative for abdominal pain. Genitourinary:  Negative for dysuria. Skin:  Negative for rash. Neurological:  Negative for syncope. Hematological:  Negative for adenopathy. More energy    Objective:     Physical Exam  Vitals and nursing note reviewed. Constitutional:       General: He is not in acute distress. Appearance: Normal appearance. HENT:      Head: Normocephalic. Right Ear: External ear normal.      Left Ear: External ear normal.      Nose: Nose normal.      Mouth/Throat:      Mouth: Mucous membranes are moist.   Eyes:      Conjunctiva/sclera: Conjunctivae normal.   Cardiovascular:      Rate and Rhythm: Normal rate and regular rhythm.       Heart sounds:

## 2023-09-18 NOTE — TELEPHONE ENCOUNTER
Kerwin Gomez is requesting a refill on the following medication(s):  Requested Prescriptions     Pending Prescriptions Disp Refills    Tirzepatide (MOUNJARO) 10 MG/0.5ML SOPN SC injection 6 mL 1     Sig: Inject 0.5 mLs into the skin once a week       Last Visit Date (If Applicable):  9/48/6447    Next Visit Date:    12/6/2023    Last refill- 03/21/2023

## 2023-11-14 ENCOUNTER — PATIENT MESSAGE (OUTPATIENT)
Dept: PRIMARY CARE CLINIC | Age: 53
End: 2023-11-14

## 2023-11-15 NOTE — TELEPHONE ENCOUNTER
From: Jaquelin Nelson  To: Dr. Cuate Garcia: 11/14/2023 8:11 PM EST  Subject: higher dose    Hello,    I need a higher dose of Semaglutide called into [facility] please. I have lost 5 pounds in the last 4 months. Can I have the equivalent of 12.5mg Mounjaro in Semaglutide please?      Thank you,  Garima Nolasco

## 2023-12-06 ENCOUNTER — OFFICE VISIT (OUTPATIENT)
Dept: PRIMARY CARE CLINIC | Age: 53
End: 2023-12-06
Payer: COMMERCIAL

## 2023-12-06 VITALS
BODY MASS INDEX: 32.41 KG/M2 | OXYGEN SATURATION: 96 % | DIASTOLIC BLOOD PRESSURE: 72 MMHG | WEIGHT: 218.8 LBS | HEART RATE: 59 BPM | HEIGHT: 69 IN | SYSTOLIC BLOOD PRESSURE: 110 MMHG

## 2023-12-06 DIAGNOSIS — E74.39 GLUCOSE INTOLERANCE: Primary | ICD-10-CM

## 2023-12-06 PROCEDURE — 99214 OFFICE O/P EST MOD 30 MIN: CPT | Performed by: FAMILY MEDICINE

## 2023-12-06 ASSESSMENT — ENCOUNTER SYMPTOMS
BACK PAIN: 0
DIARRHEA: 0
SORE THROAT: 0
SHORTNESS OF BREATH: 0
CONSTIPATION: 0
COUGH: 0
CHEST TIGHTNESS: 0

## 2023-12-06 NOTE — PROGRESS NOTES
Subjective:     Patient ID: Harvinder Tijerina is a 47 y.o. male    HPI  Rashel Long is back today for recheck of his glucose intolerance as well as his obesity. He has done very well on [place] but his insurance company no longer covered it adequate economic price. Recently was changed over to semaglutide as compounded at the local [facility] pharmacy. He seems to be tolerating the product well and he has progressed to the 2 mg/week dose. He has lost about 7 pounds since his last visit 3 months ago. Labs from [facility] explained to patient     A1c 5.4  CBC electrolytes lipids all within normal limits    Overall feels much better   more energy      He has had very mild hypertension. We continued his lisinopril to help to protect his kidney from the glucose intolerance. Review of Systems   Constitutional:  Negative for activity change, appetite change, fatigue and fever. HENT:  Negative for sore throat. Eyes:  Negative for visual disturbance. Respiratory:  Negative for cough, chest tightness and shortness of breath. Cardiovascular:  Negative for chest pain, palpitations and leg swelling. Gastrointestinal:  Negative for constipation and diarrhea. Genitourinary:  Negative for dysuria and urgency. Musculoskeletal:  Negative for back pain. Neurological:  Negative for dizziness, syncope and headaches. Hematological:  Does not bruise/bleed easily. Psychiatric/Behavioral:  Negative for confusion and sleep disturbance. The patient is not nervous/anxious. Objective:     Physical Exam  Vitals and nursing note reviewed. Constitutional:       General: He is not in acute distress. Appearance: Normal appearance. He is obese. He is not ill-appearing. HENT:      Head: Normocephalic. Right Ear: External ear normal.      Left Ear: External ear normal.      Nose: Nose normal.      Mouth/Throat:      Mouth: Mucous membranes are moist.      Pharynx: Oropharynx is clear.    Eyes:

## 2023-12-06 NOTE — PATIENT INSTRUCTIONS
Lifespan by Berto Sevilla,  florinda and YouTube on Xuan Dia Protocol     9 Tenets of Aging  Dr. Yash Simpson Not To Age

## 2025-03-26 ENCOUNTER — TRANSCRIBE ORDERS (OUTPATIENT)
Dept: ADMINISTRATIVE | Age: 55
End: 2025-03-26

## 2025-03-26 DIAGNOSIS — I83.893 VARICOSE VEINS OF LEG WITH SWELLING, BILATERAL: Primary | ICD-10-CM
